# Patient Record
Sex: FEMALE | Race: OTHER | NOT HISPANIC OR LATINO | URBAN - METROPOLITAN AREA
[De-identification: names, ages, dates, MRNs, and addresses within clinical notes are randomized per-mention and may not be internally consistent; named-entity substitution may affect disease eponyms.]

---

## 2017-03-06 PROBLEM — Z00.00 ENCOUNTER FOR PREVENTIVE HEALTH EXAMINATION: Status: ACTIVE | Noted: 2017-03-06

## 2017-03-31 ENCOUNTER — OUTPATIENT (OUTPATIENT)
Dept: OUTPATIENT SERVICES | Facility: HOSPITAL | Age: 32
LOS: 1 days | Discharge: HOME | End: 2017-03-31

## 2017-03-31 ENCOUNTER — APPOINTMENT (OUTPATIENT)
Dept: SURGERY | Facility: AMBULATORY SURGERY CENTER | Age: 32
End: 2017-03-31

## 2017-04-18 ENCOUNTER — APPOINTMENT (OUTPATIENT)
Dept: SURGERY | Facility: CLINIC | Age: 32
End: 2017-04-18

## 2017-06-27 DIAGNOSIS — K42.0 UMBILICAL HERNIA WITH OBSTRUCTION, WITHOUT GANGRENE: ICD-10-CM

## 2017-11-06 ENCOUNTER — OUTPATIENT (OUTPATIENT)
Dept: OUTPATIENT SERVICES | Facility: HOSPITAL | Age: 32
LOS: 1 days | Discharge: HOME | End: 2017-11-06

## 2017-11-06 DIAGNOSIS — O99.89 OTHER SPECIFIED DISEASES AND CONDITIONS COMPLICATING PREGNANCY, CHILDBIRTH AND THE PUERPERIUM: ICD-10-CM

## 2017-11-07 DIAGNOSIS — N81.10 CYSTOCELE, UNSPECIFIED: ICD-10-CM

## 2017-11-07 DIAGNOSIS — N39.46 MIXED INCONTINENCE: ICD-10-CM

## 2018-11-19 ENCOUNTER — LABORATORY RESULT (OUTPATIENT)
Age: 33
End: 2018-11-19

## 2018-11-19 ENCOUNTER — OUTPATIENT (OUTPATIENT)
Dept: OUTPATIENT SERVICES | Facility: HOSPITAL | Age: 33
LOS: 1 days | Discharge: HOME | End: 2018-11-19

## 2018-11-19 ENCOUNTER — APPOINTMENT (OUTPATIENT)
Dept: OBGYN | Facility: CLINIC | Age: 33
End: 2018-11-19
Payer: COMMERCIAL

## 2018-11-19 VITALS — BODY MASS INDEX: 22.88 KG/M2 | HEIGHT: 64 IN | WEIGHT: 134 LBS

## 2018-11-19 PROCEDURE — 99395 PREV VISIT EST AGE 18-39: CPT

## 2018-11-21 DIAGNOSIS — Z01.419 ENCOUNTER FOR GYNECOLOGICAL EXAMINATION (GENERAL) (ROUTINE) WITHOUT ABNORMAL FINDINGS: ICD-10-CM

## 2018-12-11 ENCOUNTER — APPOINTMENT (OUTPATIENT)
Dept: SURGERY | Facility: CLINIC | Age: 33
End: 2018-12-11
Payer: COMMERCIAL

## 2018-12-11 VITALS — HEIGHT: 64 IN | WEIGHT: 135 LBS | BODY MASS INDEX: 23.05 KG/M2

## 2018-12-11 DIAGNOSIS — R10.33 PERIUMBILICAL PAIN: ICD-10-CM

## 2018-12-11 DIAGNOSIS — M62.08 SEPARATION OF MUSCLE (NONTRAUMATIC), OTHER SITE: ICD-10-CM

## 2018-12-11 DIAGNOSIS — K42.0 UMBILICAL HERNIA WITH OBSTRUCTION, W/OUT GANGRENE: ICD-10-CM

## 2018-12-11 PROCEDURE — 99214 OFFICE O/P EST MOD 30 MIN: CPT

## 2019-11-25 ENCOUNTER — LABORATORY RESULT (OUTPATIENT)
Age: 34
End: 2019-11-25

## 2019-11-25 ENCOUNTER — OUTPATIENT (OUTPATIENT)
Dept: OUTPATIENT SERVICES | Facility: HOSPITAL | Age: 34
LOS: 1 days | Discharge: HOME | End: 2019-11-25

## 2019-11-25 ENCOUNTER — APPOINTMENT (OUTPATIENT)
Dept: OBGYN | Facility: CLINIC | Age: 34
End: 2019-11-25
Payer: COMMERCIAL

## 2019-11-25 VITALS — WEIGHT: 135 LBS | HEIGHT: 64 IN | BODY MASS INDEX: 23.05 KG/M2

## 2019-11-25 DIAGNOSIS — Z01.419 ENCOUNTER FOR GYNECOLOGICAL EXAMINATION (GENERAL) (ROUTINE) WITHOUT ABNORMAL FINDINGS: ICD-10-CM

## 2019-11-25 LAB
BILIRUB UR QL STRIP: NORMAL
CLARITY UR: CLEAR
GLUCOSE UR-MCNC: NORMAL
HCG UR QL: NORMAL EU/DL
HGB UR QL STRIP.AUTO: 250
KETONES UR-MCNC: NORMAL
LEUKOCYTE ESTERASE UR QL STRIP: NORMAL
NITRITE UR QL STRIP: NORMAL
PH UR STRIP: 6
PROT UR STRIP-MCNC: NORMAL
SP GR UR STRIP: 1.01

## 2019-11-25 PROCEDURE — 81003 URINALYSIS AUTO W/O SCOPE: CPT | Mod: NC,QW

## 2019-11-25 PROCEDURE — 99395 PREV VISIT EST AGE 18-39: CPT

## 2020-12-06 ENCOUNTER — LABORATORY RESULT (OUTPATIENT)
Age: 35
End: 2020-12-06

## 2020-12-07 ENCOUNTER — APPOINTMENT (OUTPATIENT)
Dept: OBGYN | Facility: CLINIC | Age: 35
End: 2020-12-07
Payer: COMMERCIAL

## 2020-12-07 VITALS — TEMPERATURE: 97.3 F | HEIGHT: 64 IN | WEIGHT: 140 LBS | BODY MASS INDEX: 23.9 KG/M2

## 2020-12-07 PROCEDURE — 99072 ADDL SUPL MATRL&STAF TM PHE: CPT

## 2020-12-07 PROCEDURE — 99395 PREV VISIT EST AGE 18-39: CPT

## 2021-12-12 ENCOUNTER — LABORATORY RESULT (OUTPATIENT)
Age: 36
End: 2021-12-12

## 2021-12-13 ENCOUNTER — APPOINTMENT (OUTPATIENT)
Dept: OBGYN | Facility: CLINIC | Age: 36
End: 2021-12-13
Payer: COMMERCIAL

## 2021-12-13 VITALS — TEMPERATURE: 98 F | WEIGHT: 140 LBS | BODY MASS INDEX: 23.9 KG/M2 | HEIGHT: 64 IN

## 2021-12-13 PROCEDURE — 99395 PREV VISIT EST AGE 18-39: CPT

## 2022-12-18 ENCOUNTER — LABORATORY RESULT (OUTPATIENT)
Age: 37
End: 2022-12-18

## 2022-12-19 ENCOUNTER — APPOINTMENT (OUTPATIENT)
Dept: OBGYN | Facility: CLINIC | Age: 37
End: 2022-12-19

## 2022-12-19 VITALS — HEIGHT: 64 IN | WEIGHT: 140 LBS | TEMPERATURE: 98 F | BODY MASS INDEX: 23.9 KG/M2

## 2022-12-19 DIAGNOSIS — R31.29 OTHER MICROSCOPIC HEMATURIA: ICD-10-CM

## 2022-12-19 PROCEDURE — 99395 PREV VISIT EST AGE 18-39: CPT

## 2022-12-25 PROBLEM — R31.29 MICROHEMATURIA: Status: ACTIVE | Noted: 2019-11-30

## 2024-02-21 ENCOUNTER — LABORATORY RESULT (OUTPATIENT)
Age: 39
End: 2024-02-21

## 2024-02-22 ENCOUNTER — APPOINTMENT (OUTPATIENT)
Dept: OBGYN | Facility: CLINIC | Age: 39
End: 2024-02-22
Payer: COMMERCIAL

## 2024-02-22 VITALS — BODY MASS INDEX: 23.56 KG/M2 | WEIGHT: 138 LBS | TEMPERATURE: 97.2 F | HEIGHT: 64 IN

## 2024-02-22 DIAGNOSIS — K64.9 UNSPECIFIED HEMORRHOIDS: ICD-10-CM

## 2024-02-22 DIAGNOSIS — Z01.419 ENCOUNTER FOR GYNECOLOGICAL EXAMINATION (GENERAL) (ROUTINE) W/OUT ABNORMAL FINDINGS: ICD-10-CM

## 2024-02-22 DIAGNOSIS — N81.10 CYSTOCELE, UNSPECIFIED: ICD-10-CM

## 2024-02-22 DIAGNOSIS — Z97.5 PRESENCE OF (INTRAUTERINE) CONTRACEPTIVE DEVICE: ICD-10-CM

## 2024-02-22 PROCEDURE — 99396 PREV VISIT EST AGE 40-64: CPT

## 2024-02-24 PROBLEM — N81.10 FEMALE CYSTOCELE: Status: ACTIVE | Noted: 2024-02-24

## 2024-02-24 PROBLEM — Z01.419 WELL WOMAN EXAM WITH ROUTINE GYNECOLOGICAL EXAM: Status: ACTIVE | Noted: 2018-12-01

## 2024-02-24 PROBLEM — K64.9 HEMORRHOID: Status: ACTIVE | Noted: 2024-02-24

## 2024-02-24 PROBLEM — Z97.5 IUD (INTRAUTERINE DEVICE) IN PLACE: Status: ACTIVE | Noted: 2021-12-14

## 2024-02-24 NOTE — DISCUSSION/SUMMARY
[FreeTextEntry1] : 39 YEAR OLD FEMALE LMP 2/9/2024 WITH CYCLES MONTHLY X 5, 1-2 HEAVY, PRESENTS FOR ANNUAL WELL WOMAN GYNECOLOGIC EXAMINATION AND PAP. LAST SEEN FOR WELL WOMAN VISIT 12/19/2022;  PAP AND HPV HR TESTING DONE AT THAT TIME WERE NEGATIVE.  NO NEW MEDICAL OR SURGICAL HISTORY SINCE LAST VISIT. MEDICATIONS; NONE  MEDICATION ALLERGIES; NONE ROS;BMS-NORMAL; PATERNAL GRANDFATHER WITH STOMACH CANCER;                    HEMORRHOIDS AFTER CHILDBIRTH- SAW MD           NO URINARY COMPLAINTS           SEXUALLY ACTIVE WITHOUT COMPLAINTS COPPER IUD BREASTS; DOES BREAST SELF EXAMINATION                    LAST MAMMOGRAPHY WAS DONE 5/4/2023 BIRADS II                    NO FAM HISTORY OF BREAST CANCER +EXERCISES SOMEWHAT; - MULTIVITAMINS; + DAIRY/MILK/CHEESE; - TOBACCO OR VAPING  PE;. 74; TEMP 97.2; WEIGHT 138 LBS HEIGHT 5'4"      BREASTS; N OMASSES OR DISCAHRGES; RT NIPPLE ALWASY INVERTED      ABDEOMEN; SOFT, NO MASSES; NO TENDNERESS TO PALPATION      PELVIC NORMAL EXTERNAL GENITALIA                    NORMAL VAGINA WITHOUT L;ESION\                    1ST DEGREE CYSTOCELE                    NORMAL CERVIX WITHOUT LESION- IUD STRING SEEN                   ANTEVERTEED NORMAL UTERUS ON BIMANUAL EXAMINATION                   NO PALPABLE ADNEXAL MASSES                   EXTRNAL HEMORRHOID  PLAN; THIN PREP PAP WITH HR HPV TESTING DONE-PT TO [facility] 2 WKS FOR RESULTS              BREAST SELF EXAMIANTION MONTHLY CONTINUED TO BE STRONGLY ADVISED              SCREENIGN MAMMOGRAPHY ANNUALLY ADVSIED AND REFERRAL GIVEN FOR 5/2024              RETURN TO OFFICE I YEAR OR PRN

## 2024-05-13 ENCOUNTER — NON-APPOINTMENT (OUTPATIENT)
Age: 39
End: 2024-05-13

## 2024-05-13 ENCOUNTER — APPOINTMENT (OUTPATIENT)
Dept: OBGYN | Facility: CLINIC | Age: 39
End: 2024-05-13
Payer: COMMERCIAL

## 2024-05-13 PROCEDURE — 76830 TRANSVAGINAL US NON-OB: CPT

## 2024-12-02 ENCOUNTER — NON-APPOINTMENT (OUTPATIENT)
Age: 39
End: 2024-12-02

## 2024-12-03 DIAGNOSIS — R92.30 DENSE BREASTS, UNSPECIFIED: ICD-10-CM

## 2025-01-02 DIAGNOSIS — N39.0 URINARY TRACT INFECTION, SITE NOT SPECIFIED: ICD-10-CM

## 2025-01-02 RX ORDER — NITROFURANTOIN (MONOHYDRATE/MACROCRYSTALS) 25; 75 MG/1; MG/1
100 CAPSULE ORAL
Qty: 14 | Refills: 0 | Status: ACTIVE | COMMUNITY
Start: 2025-01-02 | End: 1900-01-01

## 2025-02-13 ENCOUNTER — LABORATORY RESULT (OUTPATIENT)
Age: 40
End: 2025-02-13

## 2025-02-13 ENCOUNTER — APPOINTMENT (OUTPATIENT)
Dept: OBGYN | Facility: CLINIC | Age: 40
End: 2025-02-13
Payer: COMMERCIAL

## 2025-02-13 VITALS — WEIGHT: 293 LBS | BODY MASS INDEX: 50.02 KG/M2 | TEMPERATURE: 97.3 F | HEIGHT: 64 IN

## 2025-02-13 DIAGNOSIS — K64.9 UNSPECIFIED HEMORRHOIDS: ICD-10-CM

## 2025-02-13 DIAGNOSIS — Z01.419 ENCOUNTER FOR GYNECOLOGICAL EXAMINATION (GENERAL) (ROUTINE) W/OUT ABNORMAL FINDINGS: ICD-10-CM

## 2025-02-13 DIAGNOSIS — N81.10 CYSTOCELE, UNSPECIFIED: ICD-10-CM

## 2025-02-13 LAB
APPEARANCE: CLEAR
BILIRUBIN URINE: NEGATIVE
BLOOD URINE: ABNORMAL
COLOR: YELLOW
GLUCOSE QUALITATIVE U: NEGATIVE
KETONES URINE: NEGATIVE
LEUKOCYTE ESTERASE URINE: NEGATIVE
NITRITE URINE: NEGATIVE
PH URINE: 6
PROTEIN URINE: NEGATIVE
SPECIFIC GRAVITY URINE: >=1.03
UROBILINOGEN URINE: 0.2 (ref 0.2–?)

## 2025-02-13 PROCEDURE — 99396 PREV VISIT EST AGE 40-64: CPT

## 2025-04-01 ENCOUNTER — NON-APPOINTMENT (OUTPATIENT)
Age: 40
End: 2025-04-01

## 2025-06-05 ENCOUNTER — NON-APPOINTMENT (OUTPATIENT)
Age: 40
End: 2025-06-05